# Patient Record
Sex: FEMALE | Race: WHITE | NOT HISPANIC OR LATINO | Employment: OTHER | URBAN - METROPOLITAN AREA
[De-identification: names, ages, dates, MRNs, and addresses within clinical notes are randomized per-mention and may not be internally consistent; named-entity substitution may affect disease eponyms.]

---

## 2017-01-03 ENCOUNTER — HOSPITAL ENCOUNTER (OUTPATIENT)
Facility: HOSPITAL | Age: 82
Setting detail: OBSERVATION
Discharge: NURSING, REHAB OR LONG-TERM CARE | End: 2017-01-04
Attending: EMERGENCY MEDICINE | Admitting: HOSPITALIST
Payer: MEDICARE

## 2017-01-03 DIAGNOSIS — N39.0 URINARY TRACT INFECTION: Primary | ICD-10-CM

## 2017-01-03 PROBLEM — I73.00 RAYNAUD PHENOMENON: Status: ACTIVE | Noted: 2017-01-03

## 2017-01-03 PROBLEM — M48.00 SPINAL STENOSIS: Status: ACTIVE | Noted: 2017-01-03

## 2017-01-03 PROBLEM — M81.0 OSTEOPOROSIS: Status: ACTIVE | Noted: 2017-01-03

## 2017-01-03 PROBLEM — K21.9 GERD (GASTROESOPHAGEAL REFLUX DISEASE): Status: ACTIVE | Noted: 2017-01-03

## 2017-01-03 PROBLEM — F03.90 DEMENTIA (HCC): Status: ACTIVE | Noted: 2017-01-03

## 2017-01-03 PROBLEM — Z86.718 HISTORY OF DVT (DEEP VEIN THROMBOSIS): Status: ACTIVE | Noted: 2017-01-03

## 2017-01-03 LAB
ANION GAP SERPL CALCULATED.3IONS-SCNC: 7 MMOL/L (ref 4–13)
BASOPHILS # BLD AUTO: 0.03 THOUSANDS/ΜL (ref 0–0.1)
BASOPHILS NFR BLD AUTO: 0 % (ref 0–1)
BUN SERPL-MCNC: 14 MG/DL (ref 5–25)
CALCIUM SERPL-MCNC: 9.7 MG/DL (ref 8.3–10.1)
CHLORIDE SERPL-SCNC: 105 MMOL/L (ref 100–108)
CO2 SERPL-SCNC: 28 MMOL/L (ref 21–32)
CREAT SERPL-MCNC: 0.6 MG/DL (ref 0.6–1.3)
EOSINOPHIL # BLD AUTO: 0.15 THOUSAND/ΜL (ref 0–0.61)
EOSINOPHIL NFR BLD AUTO: 2 % (ref 0–6)
ERYTHROCYTE [DISTWIDTH] IN BLOOD BY AUTOMATED COUNT: 15.1 % (ref 11.6–15.1)
GFR SERPL CREATININE-BSD FRML MDRD: >60 ML/MIN/1.73SQ M
GLUCOSE SERPL-MCNC: 92 MG/DL (ref 65–140)
HCT VFR BLD AUTO: 39.3 % (ref 34.8–46.1)
HGB BLD-MCNC: 12.9 G/DL (ref 11.5–15.4)
LYMPHOCYTES # BLD AUTO: 2.48 THOUSANDS/ΜL (ref 0.6–4.47)
LYMPHOCYTES NFR BLD AUTO: 36 % (ref 14–44)
MCH RBC QN AUTO: 29.5 PG (ref 26.8–34.3)
MCHC RBC AUTO-ENTMCNC: 32.8 G/DL (ref 31.4–37.4)
MCV RBC AUTO: 90 FL (ref 82–98)
MONOCYTES # BLD AUTO: 0.48 THOUSAND/ΜL (ref 0.17–1.22)
MONOCYTES NFR BLD AUTO: 7 % (ref 4–12)
NEUTROPHILS # BLD AUTO: 3.75 THOUSANDS/ΜL (ref 1.85–7.62)
NEUTS SEG NFR BLD AUTO: 55 % (ref 43–75)
NRBC BLD AUTO-RTO: 0 /100 WBCS
PLATELET # BLD AUTO: 167 THOUSANDS/UL (ref 149–390)
PMV BLD AUTO: 11.2 FL (ref 8.9–12.7)
POTASSIUM SERPL-SCNC: 4 MMOL/L (ref 3.5–5.3)
RBC # BLD AUTO: 4.37 MILLION/UL (ref 3.81–5.12)
SODIUM SERPL-SCNC: 140 MMOL/L (ref 136–145)
WBC # BLD AUTO: 6.9 THOUSAND/UL (ref 4.31–10.16)

## 2017-01-03 PROCEDURE — 80048 BASIC METABOLIC PNL TOTAL CA: CPT | Performed by: EMERGENCY MEDICINE

## 2017-01-03 PROCEDURE — 96374 THER/PROPH/DIAG INJ IV PUSH: CPT

## 2017-01-03 PROCEDURE — 36415 COLL VENOUS BLD VENIPUNCTURE: CPT | Performed by: EMERGENCY MEDICINE

## 2017-01-03 PROCEDURE — 85025 COMPLETE CBC W/AUTO DIFF WBC: CPT | Performed by: EMERGENCY MEDICINE

## 2017-01-03 RX ORDER — MULTIVITAMIN
1 CAPSULE ORAL DAILY
COMMUNITY

## 2017-01-03 RX ORDER — MELATONIN
1000 DAILY
Status: DISCONTINUED | OUTPATIENT
Start: 2017-01-04 | End: 2017-01-04 | Stop reason: HOSPADM

## 2017-01-03 RX ORDER — DOCUSATE SODIUM 100 MG/1
100 CAPSULE, LIQUID FILLED ORAL DAILY
Status: DISCONTINUED | OUTPATIENT
Start: 2017-01-04 | End: 2017-01-04 | Stop reason: HOSPADM

## 2017-01-03 RX ORDER — ONDANSETRON 2 MG/ML
4 INJECTION INTRAMUSCULAR; INTRAVENOUS EVERY 6 HOURS PRN
Status: DISCONTINUED | OUTPATIENT
Start: 2017-01-03 | End: 2017-01-04 | Stop reason: HOSPADM

## 2017-01-03 RX ORDER — DEXTRIN 3 G/3.5 G
POWDER (GRAM) ORAL
COMMUNITY

## 2017-01-03 RX ORDER — ACETAMINOPHEN 325 MG/1
650 TABLET ORAL EVERY 6 HOURS PRN
Status: DISCONTINUED | OUTPATIENT
Start: 2017-01-03 | End: 2017-01-04 | Stop reason: HOSPADM

## 2017-01-03 RX ORDER — MIRTAZAPINE 15 MG/1
15 TABLET, FILM COATED ORAL
Status: DISCONTINUED | OUTPATIENT
Start: 2017-01-04 | End: 2017-01-04 | Stop reason: HOSPADM

## 2017-01-03 RX ORDER — DONEPEZIL HYDROCHLORIDE 5 MG/1
5 TABLET, FILM COATED ORAL
COMMUNITY

## 2017-01-03 RX ORDER — LIDOCAINE 50 MG/G
1 PATCH TOPICAL EVERY 24 HOURS
COMMUNITY

## 2017-01-03 RX ORDER — AMLODIPINE BESYLATE 2.5 MG/1
2.5 TABLET ORAL DAILY
Status: DISCONTINUED | OUTPATIENT
Start: 2017-01-04 | End: 2017-01-04 | Stop reason: HOSPADM

## 2017-01-03 RX ORDER — AMLODIPINE BESYLATE 2.5 MG/1
2.5 TABLET ORAL DAILY
COMMUNITY

## 2017-01-03 RX ORDER — DONEPEZIL HYDROCHLORIDE 5 MG/1
5 TABLET, FILM COATED ORAL
Status: DISCONTINUED | OUTPATIENT
Start: 2017-01-04 | End: 2017-01-04 | Stop reason: HOSPADM

## 2017-01-03 RX ORDER — LIDOCAINE 50 MG/G
1 PATCH TOPICAL EVERY 24 HOURS
Status: DISCONTINUED | OUTPATIENT
Start: 2017-01-03 | End: 2017-01-04 | Stop reason: HOSPADM

## 2017-01-03 RX ORDER — DOCUSATE SODIUM 100 MG/1
100 CAPSULE, LIQUID FILLED ORAL DAILY
COMMUNITY

## 2017-01-03 RX ORDER — LANOLIN ALCOHOL/MO/W.PET/CERES
2 CREAM (GRAM) TOPICAL 2 TIMES DAILY
COMMUNITY

## 2017-01-03 RX ORDER — MIRTAZAPINE 15 MG/1
15 TABLET, FILM COATED ORAL
COMMUNITY

## 2017-01-03 RX ORDER — OMEPRAZOLE 20 MG/1
20 CAPSULE, DELAYED RELEASE ORAL 2 TIMES DAILY
COMMUNITY

## 2017-01-03 RX ORDER — SIMETHICONE 80 MG
80 TABLET,CHEWABLE ORAL
Status: DISCONTINUED | OUTPATIENT
Start: 2017-01-03 | End: 2017-01-04 | Stop reason: HOSPADM

## 2017-01-03 RX ORDER — B-COMPLEX WITH VITAMIN C
1 TABLET ORAL
Status: DISCONTINUED | OUTPATIENT
Start: 2017-01-04 | End: 2017-01-04 | Stop reason: HOSPADM

## 2017-01-03 RX ORDER — PANTOPRAZOLE SODIUM 40 MG/1
40 TABLET, DELAYED RELEASE ORAL
Status: DISCONTINUED | OUTPATIENT
Start: 2017-01-04 | End: 2017-01-04 | Stop reason: HOSPADM

## 2017-01-03 RX ORDER — ACETAMINOPHEN 325 MG/1
650 TABLET ORAL EVERY 6 HOURS PRN
COMMUNITY

## 2017-01-03 RX ADMIN — Medication 1000 MG: at 17:53

## 2017-01-04 VITALS
SYSTOLIC BLOOD PRESSURE: 149 MMHG | OXYGEN SATURATION: 97 % | DIASTOLIC BLOOD PRESSURE: 89 MMHG | TEMPERATURE: 97.4 F | WEIGHT: 115 LBS | HEART RATE: 75 BPM | RESPIRATION RATE: 18 BRPM

## 2017-01-04 PROBLEM — R82.79 URINE CULTURE POSITIVE: Status: ACTIVE | Noted: 2017-01-04

## 2017-01-04 PROBLEM — G89.29 CHRONIC ABDOMINAL PAIN: Chronic | Status: ACTIVE | Noted: 2017-01-04

## 2017-01-04 PROBLEM — R10.9 CHRONIC ABDOMINAL PAIN: Chronic | Status: ACTIVE | Noted: 2017-01-04

## 2017-01-04 PROCEDURE — 99284 EMERGENCY DEPT VISIT MOD MDM: CPT

## 2017-01-04 RX ADMIN — DOCUSATE SODIUM 100 MG: 100 CAPSULE, LIQUID FILLED ORAL at 09:04

## 2017-01-04 RX ADMIN — DONEPEZIL HYDROCHLORIDE 5 MG: 5 TABLET, FILM COATED ORAL at 00:35

## 2017-01-04 RX ADMIN — RIVAROXABAN 20 MG: 20 TABLET, FILM COATED ORAL at 08:01

## 2017-01-04 RX ADMIN — CALCIUM CARBONATE 500 MG (1,250 MG)-VITAMIN D3 200 UNIT TABLET 1 TABLET: at 08:00

## 2017-01-04 RX ADMIN — AMLODIPINE BESYLATE 2.5 MG: 2.5 TABLET ORAL at 09:00

## 2017-01-04 RX ADMIN — SIMETHICONE CHEW TAB 80 MG 80 MG: 80 TABLET ORAL at 00:35

## 2017-01-04 RX ADMIN — SIMETHICONE CHEW TAB 80 MG 80 MG: 80 TABLET ORAL at 08:02

## 2017-01-04 RX ADMIN — VITAMIN D, TAB 1000IU (100/BT) 1000 UNITS: 25 TAB at 09:02

## 2017-01-04 RX ADMIN — MIRTAZAPINE 15 MG: 15 TABLET, FILM COATED ORAL at 00:36

## 2017-01-04 RX ADMIN — PANTOPRAZOLE SODIUM 40 MG: 40 TABLET, DELAYED RELEASE ORAL at 05:38

## 2017-05-21 ENCOUNTER — APPOINTMENT (EMERGENCY)
Dept: RADIOLOGY | Facility: HOSPITAL | Age: 82
End: 2017-05-21
Payer: MEDICARE

## 2017-05-21 ENCOUNTER — HOSPITAL ENCOUNTER (EMERGENCY)
Facility: HOSPITAL | Age: 82
Discharge: NURSING, REHAB OR LONG-TERM CARE | End: 2017-05-21
Attending: EMERGENCY MEDICINE | Admitting: EMERGENCY MEDICINE
Payer: MEDICARE

## 2017-05-21 VITALS
OXYGEN SATURATION: 98 % | SYSTOLIC BLOOD PRESSURE: 159 MMHG | TEMPERATURE: 98.1 F | RESPIRATION RATE: 16 BRPM | HEART RATE: 85 BPM | DIASTOLIC BLOOD PRESSURE: 83 MMHG

## 2017-05-21 DIAGNOSIS — B02.9 HERPES ZOSTER WITHOUT COMPLICATION: ICD-10-CM

## 2017-05-21 DIAGNOSIS — R10.9 NONSPECIFIC ABDOMINAL PAIN: Primary | ICD-10-CM

## 2017-05-21 LAB
ALBUMIN SERPL BCP-MCNC: 3.5 G/DL (ref 3.5–5)
ALP SERPL-CCNC: 55 U/L (ref 46–116)
ALT SERPL W P-5'-P-CCNC: 18 U/L (ref 12–78)
ANION GAP SERPL CALCULATED.3IONS-SCNC: 5 MMOL/L (ref 4–13)
AST SERPL W P-5'-P-CCNC: 16 U/L (ref 5–45)
BACTERIA UR QL AUTO: ABNORMAL /HPF
BASOPHILS # BLD AUTO: 0.05 THOUSANDS/ΜL (ref 0–0.1)
BASOPHILS NFR BLD AUTO: 1 % (ref 0–1)
BILIRUB SERPL-MCNC: 0.35 MG/DL (ref 0.2–1)
BILIRUB UR QL STRIP: NEGATIVE
BUN SERPL-MCNC: 15 MG/DL (ref 5–25)
CALCIUM SERPL-MCNC: 9.1 MG/DL (ref 8.3–10.1)
CHLORIDE SERPL-SCNC: 108 MMOL/L (ref 100–108)
CLARITY UR: CLEAR
CO2 SERPL-SCNC: 30 MMOL/L (ref 21–32)
COLOR UR: YELLOW
COLOR, POC: NORMAL
CREAT SERPL-MCNC: 0.52 MG/DL (ref 0.6–1.3)
EOSINOPHIL # BLD AUTO: 0.06 THOUSAND/ΜL (ref 0–0.61)
EOSINOPHIL NFR BLD AUTO: 1 % (ref 0–6)
ERYTHROCYTE [DISTWIDTH] IN BLOOD BY AUTOMATED COUNT: 15.6 % (ref 11.6–15.1)
GFR SERPL CREATININE-BSD FRML MDRD: >60 ML/MIN/1.73SQ M
GLUCOSE SERPL-MCNC: 97 MG/DL (ref 65–140)
GLUCOSE UR STRIP-MCNC: NEGATIVE MG/DL
HCT VFR BLD AUTO: 39.9 % (ref 34.8–46.1)
HGB BLD-MCNC: 12.8 G/DL (ref 11.5–15.4)
HGB UR QL STRIP.AUTO: ABNORMAL
HYALINE CASTS #/AREA URNS LPF: ABNORMAL /LPF
KETONES UR STRIP-MCNC: NEGATIVE MG/DL
LEUKOCYTE ESTERASE UR QL STRIP: ABNORMAL
LYMPHOCYTES # BLD AUTO: 1.82 THOUSANDS/ΜL (ref 0.6–4.47)
LYMPHOCYTES NFR BLD AUTO: 21 % (ref 14–44)
MCH RBC QN AUTO: 28.9 PG (ref 26.8–34.3)
MCHC RBC AUTO-ENTMCNC: 32.1 G/DL (ref 31.4–37.4)
MCV RBC AUTO: 90 FL (ref 82–98)
MONOCYTES # BLD AUTO: 0.56 THOUSAND/ΜL (ref 0.17–1.22)
MONOCYTES NFR BLD AUTO: 6 % (ref 4–12)
NEUTROPHILS # BLD AUTO: 6.23 THOUSANDS/ΜL (ref 1.85–7.62)
NEUTS SEG NFR BLD AUTO: 71 % (ref 43–75)
NITRITE UR QL STRIP: NEGATIVE
NON-SQ EPI CELLS URNS QL MICRO: ABNORMAL /HPF
NRBC BLD AUTO-RTO: 0 /100 WBCS
PH UR STRIP.AUTO: 7.5 [PH] (ref 4.5–8)
PLATELET # BLD AUTO: 151 THOUSANDS/UL (ref 149–390)
PMV BLD AUTO: 10.6 FL (ref 8.9–12.7)
POTASSIUM SERPL-SCNC: 4 MMOL/L (ref 3.5–5.3)
PROT SERPL-MCNC: 7 G/DL (ref 6.4–8.2)
PROT UR STRIP-MCNC: NEGATIVE MG/DL
RBC # BLD AUTO: 4.43 MILLION/UL (ref 3.81–5.12)
RBC #/AREA URNS AUTO: ABNORMAL /HPF
SODIUM SERPL-SCNC: 143 MMOL/L (ref 136–145)
SP GR UR STRIP.AUTO: 1.01 (ref 1–1.03)
UROBILINOGEN UR QL STRIP.AUTO: 0.2 E.U./DL
WBC # BLD AUTO: 8.74 THOUSAND/UL (ref 4.31–10.16)
WBC #/AREA URNS AUTO: ABNORMAL /HPF

## 2017-05-21 PROCEDURE — 81001 URINALYSIS AUTO W/SCOPE: CPT

## 2017-05-21 PROCEDURE — 81002 URINALYSIS NONAUTO W/O SCOPE: CPT | Performed by: EMERGENCY MEDICINE

## 2017-05-21 PROCEDURE — 87086 URINE CULTURE/COLONY COUNT: CPT

## 2017-05-21 PROCEDURE — 99284 EMERGENCY DEPT VISIT MOD MDM: CPT

## 2017-05-21 PROCEDURE — 85025 COMPLETE CBC W/AUTO DIFF WBC: CPT | Performed by: EMERGENCY MEDICINE

## 2017-05-21 PROCEDURE — 36415 COLL VENOUS BLD VENIPUNCTURE: CPT | Performed by: EMERGENCY MEDICINE

## 2017-05-21 PROCEDURE — 71020 HB CHEST X-RAY 2VW FRONTAL&LATL: CPT

## 2017-05-21 PROCEDURE — 74177 CT ABD & PELVIS W/CONTRAST: CPT

## 2017-05-21 PROCEDURE — 80053 COMPREHEN METABOLIC PANEL: CPT | Performed by: EMERGENCY MEDICINE

## 2017-05-21 RX ORDER — ALBUTEROL SULFATE 90 UG/1
2 AEROSOL, METERED RESPIRATORY (INHALATION) EVERY 6 HOURS PRN
COMMUNITY

## 2017-05-21 RX ORDER — NITROFURANTOIN 25; 75 MG/1; MG/1
100 CAPSULE ORAL 2 TIMES DAILY
Qty: 14 CAPSULE | Refills: 0 | Status: SHIPPED | OUTPATIENT
Start: 2017-05-21 | End: 2017-05-28

## 2017-05-21 RX ORDER — ACYCLOVIR 200 MG/1
800 CAPSULE ORAL ONCE
Status: COMPLETED | OUTPATIENT
Start: 2017-05-21 | End: 2017-05-21

## 2017-05-21 RX ORDER — ACYCLOVIR 400 MG/1
800 TABLET ORAL 4 TIMES DAILY
Qty: 40 TABLET | Refills: 0 | Status: SHIPPED | OUTPATIENT
Start: 2017-05-21 | End: 2017-05-26

## 2017-05-21 RX ADMIN — ACYCLOVIR 800 MG: 200 CAPSULE ORAL at 16:53

## 2017-05-21 RX ADMIN — IOHEXOL 100 ML: 350 INJECTION, SOLUTION INTRAVENOUS at 13:20

## 2017-05-23 LAB
BACTERIA UR CULT: NORMAL
BACTERIA UR CULT: NORMAL

## 2018-10-04 ENCOUNTER — IMMUNIZATION (OUTPATIENT)
Dept: GERIATRICS | Age: 83
End: 2018-10-04
Payer: MEDICARE

## 2018-10-04 DIAGNOSIS — Z23 ENCOUNTER FOR IMMUNIZATION: ICD-10-CM

## 2018-10-04 PROCEDURE — 90662 IIV NO PRSV INCREASED AG IM: CPT | Performed by: NURSE PRACTITIONER

## 2018-10-04 PROCEDURE — G0008 ADMIN INFLUENZA VIRUS VAC: HCPCS | Performed by: NURSE PRACTITIONER

## 2019-07-19 ENCOUNTER — TELEPHONE (OUTPATIENT)
Dept: OTHER | Facility: OTHER | Age: 84
End: 2019-07-19

## 2019-07-22 ENCOUNTER — NURSING HOME VISIT (OUTPATIENT)
Dept: GERIATRICS | Facility: OTHER | Age: 84
End: 2019-07-22
Payer: MEDICARE

## 2019-07-22 DIAGNOSIS — Z71.89 GOALS OF CARE, COUNSELING/DISCUSSION: ICD-10-CM

## 2019-07-22 DIAGNOSIS — S02.85XD CLOSED FRACTURE OF ORBIT WITH ROUTINE HEALING, SUBSEQUENT ENCOUNTER: Primary | ICD-10-CM

## 2019-07-22 DIAGNOSIS — R26.2 AMBULATORY DYSFUNCTION: ICD-10-CM

## 2019-07-22 DIAGNOSIS — R54 FRAILTY: ICD-10-CM

## 2019-07-22 DIAGNOSIS — M65.9 SYNOVITIS OF WRIST: ICD-10-CM

## 2019-07-22 DIAGNOSIS — Z79.899 ENCOUNTER FOR MEDICATION REVIEW: ICD-10-CM

## 2019-07-22 DIAGNOSIS — K21.9 GASTROESOPHAGEAL REFLUX DISEASE, ESOPHAGITIS PRESENCE NOT SPECIFIED: ICD-10-CM

## 2019-07-22 DIAGNOSIS — M81.0 AGE RELATED OSTEOPOROSIS, UNSPECIFIED PATHOLOGICAL FRACTURE PRESENCE: ICD-10-CM

## 2019-07-22 DIAGNOSIS — F03.90 DEMENTIA WITHOUT BEHAVIORAL DISTURBANCE, UNSPECIFIED DEMENTIA TYPE (HCC): ICD-10-CM

## 2019-07-22 DIAGNOSIS — N39.0 FREQUENT UTI: ICD-10-CM

## 2019-07-22 DIAGNOSIS — F01.50 VASCULAR DEMENTIA WITHOUT BEHAVIORAL DISTURBANCE (HCC): ICD-10-CM

## 2019-07-22 DIAGNOSIS — R32 URINARY INCONTINENCE, UNSPECIFIED TYPE: ICD-10-CM

## 2019-07-22 DIAGNOSIS — G89.11 ACUTE PAIN DUE TO TRAUMA: ICD-10-CM

## 2019-07-22 DIAGNOSIS — I73.00 RAYNAUD'S PHENOMENON WITHOUT GANGRENE: ICD-10-CM

## 2019-07-22 DIAGNOSIS — H91.93 BILATERAL HEARING LOSS, UNSPECIFIED HEARING LOSS TYPE: ICD-10-CM

## 2019-07-22 PROCEDURE — 99306 1ST NF CARE HIGH MDM 50: CPT | Performed by: FAMILY MEDICINE

## 2019-07-22 NOTE — PROGRESS NOTES
Ankit Sanabria   [address] [facility], [address] [facility]  History and Physical    Records Reviewed include:  Hospital records  Unable to obtain from patient due to: unable to obtain from patient: Dementia    Chief Complaint/ Reason for Admission: Subacute Rehabilitation     History of Present Illness:              Aline Chávez, 80 y old female patient got admitted to [facility] for subacute rehabilitation  Patient was intially admitted to the hospital with a history of fall and injury to her left side of the face and neck  Patient has known medical history of Demtia, Osteoporosis, GERD, Ankylosing spondylitis, DVT,  Lt femur fracture and surgery for it  According to her son patient had history of fall during a bath at [facility] living and got hurt on her face  Patient lives in [facility] living for past five years  During the hospital stay she had CT head which showed no intracranial hemorrhage but facial bone fracture on the left and orbital fracture  Ct- cervical spine negative for any fracture  Ct-Abdomen showed no intraabdominal injury, but showed 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 4 1cm Patient also had X ray shoulder and hip at the hospital, which did not show any fracture  Plastic surgeon was consulted, no surgeries done, and will follow up in a week  During her hospital stay patient had swelling on her right and left wrist and was started on Prednison was tapered and stopped  Her C-reactive protein and ESR were elevated  She is getting Rheumatologist opinion in the month of August/2019  Patient is completely dependent for her ADLS, and has urinary incontinence and she was place on Cipro for her recurrent UTI  Patient required physical and occupational therapy for her muscle weakness and ambulatory dysfunction Patient is transferred to [facility] for further treatment  Patient is admitted at [facility] for subacute rehabilitation   She is non- verbal, most history obtained from her son and the hospital records  Patient was seen in her room sitting on a wheelchair, with her son  Allergies    Allergies   Allergen Reactions    Lactose        Past Medical History  Past Medical History:   Diagnosis Date    Dizziness     Gait abnormality     Hypertension     Osteoporosis     Spinal stenosis      Family History; Non contributory                               Social History  Social History     Tobacco Use   Smoking Status Former Smoker   Smokeless Tobacco Former User      Social History     Substance and Sexual Activity   Alcohol Use No      Social History     Substance and Sexual Activity   Drug Use No        Lives: Assisted Living,  Social Support:   Education Level: High School  Occupation: Clerical work  Fall in the past 12 months: once   Use of assistance Device: Wheelchair    Physical Exam    Weight: 108 8lbs Temp: 9 1 BP: 94/60 Pulse:86/m  Resp:18/m  O2 Sat:91% RA  Constitutional: Cachetic  Orientation:Person     Physical Exam   Constitutional: No distress  Pt non verbal   HENT:   Head: Normocephalic  Mouth/Throat: Oropharynx is clear and moist    Pt has bruises on her left side of the face and neck     Eyes: Pupils are equal, round, and reactive to light  Conjunctivae and EOM are normal  Right eye exhibits no discharge  Left eye exhibits no discharge  Neck: Normal range of motion  Neck supple  No JVD present  No tracheal deviation present  Cardiovascular: Normal rate, regular rhythm and normal heart sounds  Exam reveals no friction rub  No murmur heard  Pulmonary/Chest: Effort normal and breath sounds normal  She has no wheezes  She exhibits no tenderness  Abdominal: Soft  Bowel sounds are normal  She exhibits no distension  There is no tenderness  Musculoskeletal: She exhibits tenderness  She exhibits no edema  Reduced muscle mass LL>UL  ROM restricted LL>UL     Neurological: She is alert  No cranial nerve deficit     Pt oriented t self and her son    Skin: Skin is warm  No rash noted  Post fall bruises on left side of face neck   stages of hematoma presents   Psychiatric:   Mood, behavior difficult to assess due to her Dementia   Nursing note and vitals reviewed  Review of Systems:  Review of Systems   Constitutional:        Patient has minimum comunication due to her dementia status  ROS was not able to obtain       List of Current Medications: Allergies    Allergies   Allergen Reactions    Lactose        Labs/Diagnostics (reviewed by this provider): Hospital Paperwork  07/16  Hemoglobin  11 3Low     Hematocrit  33 8Low     WBC  11 4High       Ref Range & Units 7/15/19 1336    C-Reactive Protein  144  0high     Sed Rate 0 - 30 mm/hr 51High         Imaging Reviewed: 1  X- ray Left Shoulder and X-ray Octavio Hp : No eveidence of acute fracture  2  CT- Head c/ocontrast   No Intracranial Fracture   Old white matter infarction    Facial bone fracture on the left with left orbital fractures, tripod fracture  3  Ct Cerviacl spine  No cervical fracture  4  Ct Thoracic spine   No acute fracture  5  Ct chest and Abdomen    No acute injury    Colonic diverticulosis    cross fused renal ectopia    Mild fusiform aneurysm dilation of the ascending thoracic aorta 4 3am     2 Infrarenal abdominal aortic aneurysm 4 1cm        Assessment/Plan:    1  Muscle weakness, Ambulatory dysfuction; PT/OT      Fall precaution      Nutritional care    2  Left Orbital Fracturesacute       Acute Pain      Plastic surgeon consultation done and follow up in a week     Brusis resolving     Cold compression for her face and neck three time a day     Cont Tylenol 500mg q6h       3  Dementia- no new changes      Supportive care      Encourage social, physical activities      Hold on Aricept     4  GERD under control    Omeprazol 20 mg     4  Left and right Wrist swelling - resolved with Prednison     Elevated- CRP and ESR     Rhematologist consult in August/2019    5   HTN    Blood pressure on lower side 94/60    Monitor bld pressure    Hold on Norvasc    6  Urinary Incontinence/ Recurrent UTI      D/C Cipro       Cranberry Juice ( nectar thick)     7  GERD- Stable       D/C omeprazol         Pain: Present  Origin: Fall Location: Left face and Left leg  Rehab Potential:Limited  Patient Informed of Medical Condition: Yes:  Patient is Capable of Understanding Their Right: Minimal due to Dementia  Prognosis:Fair  Discharge Plan:  To [facility] living after Rehabilitation  Surrogate Decision Maker: SON  Flu Vaccine: Vaccine: Received on 10/4/2108  Advanced Directive; yes  Code status:DNR (Per discussion with resident or Nafisa Albert)      Waqar Huggins MD   Geriatric Fellow  7/16/060233:22 PM

## 2019-07-22 NOTE — PROGRESS NOTES
Chest Pain: {MI Rule out assessment:76662::"with no ECG evidence of acute ischemia and negative cardiac enzymes "}    Plan:  {plan:95973}

## 2019-07-25 ENCOUNTER — NURSING HOME VISIT (OUTPATIENT)
Dept: GERIATRICS | Facility: OTHER | Age: 84
End: 2019-07-25
Payer: MEDICARE

## 2019-07-25 DIAGNOSIS — F03.90 DEMENTIA WITHOUT BEHAVIORAL DISTURBANCE, UNSPECIFIED DEMENTIA TYPE (HCC): ICD-10-CM

## 2019-07-25 DIAGNOSIS — S02.32XA CLOSED FRACTURE OF LEFT ORBITAL FLOOR (HCC): Primary | ICD-10-CM

## 2019-07-25 DIAGNOSIS — E46 PROTEIN CALORIE MALNUTRITION (HCC): ICD-10-CM

## 2019-07-25 DIAGNOSIS — R53.81 PHYSICAL DECONDITIONING: ICD-10-CM

## 2019-07-25 DIAGNOSIS — R29.898 MUSCULAR DECONDITIONING: ICD-10-CM

## 2019-07-25 PROCEDURE — 99309 SBSQ NF CARE MODERATE MDM 30: CPT | Performed by: NURSE PRACTITIONER

## 2019-07-25 NOTE — PROGRESS NOTES
[address], [address]  STR Note    Chief Complaint/Reason for visit: STR follow up- Left Orbital Fracture; Muscle deconditioning, physical deconditioning, and ambulatory dysfunction; Dementia; HTN; Low BMI  History of Present Illness: 80year-old female seen and examined for follow up  Daughter Marine Da Silva was at bedside for exam and addressed concerns regarding patient's weakness and deconditioning  Patient looked at me when I spoke to her  Answered with yes or no to few questions asked then closed her eyes for the rest of exam  She is not reliable for history  She did not show any s/s of pain  Past Medical History: unchanged from history and physical  Past Medical History:   Diagnosis Date    Dizziness     Gait abnormality     Hypertension     Osteoporosis     Spinal stenosis      Family History: unchanged from history and physical  Social History: unchanged from history and physical  Resident Since: 07/19/2019  Review of systems: Review of Systems   Unable to perform ROS: Dementia     Medications: No changes made  Allergies: Reviewed and unchanged  Consults reviewed:PT, OT and Other  Labs/Diagnostics (reviewed by this provider): Copy in Chart    Imaging Reviewed: No new    Physical Exam    Weight:  Temp: 96 6 BP: 97/59 Pulse: 82 Resp: 18 O2 Sat: 95% RA  Constitutional: Normocephalic  Orientation:Alert , confused, and disoriented    Physical Exam   Constitutional: She appears well-developed  No distress  Appeared tired  HENT:   Resolving ecchymosis left side of face  Eyes: Pupils are equal, round, and reactive to light  EOM are normal  Right eye exhibits no discharge  Left eye exhibits no discharge  Neck: Normal range of motion  Neck supple  No JVD present  No tracheal deviation present  Cardiovascular: Normal rate, regular rhythm and normal heart sounds  Exam reveals no gallop and no friction rub  No murmur heard    Pulmonary/Chest: Effort normal and breath sounds normal  No respiratory distress  She has no wheezes  She has no rales  Abdominal: Soft  Bowel sounds are normal  She exhibits no distension  There is no tenderness  There is no guarding  Musculoskeletal: She exhibits no edema  BLE weakness  Lymphadenopathy:     She has no cervical adenopathy  Neurological: She is alert  Skin: Skin is warm and dry  She is not diaphoretic  Psychiatric: Her behavior is normal    Flat affect   Nursing note and vitals reviewed  Assessment/Plan:  77-year-old female with:     Left Orbital Fracture  -f/u with plastic surgeon  -resolving ecchymosis   -continue scheduled tylenol 500mg po QID    Muscle deconditioning, physical deconditioning, and ambulatory dysfunction  -[facility] and support at [facility] for ADLs, PT, OT  -fall precautions  -provide nutritional support  -daughter was concerned that patient is not participating well in PT   Spoke to OT to keep me apprised on progress    Dementia  -provide supportive care, redirection, and reorientation   -encourage social activities    HTN  -currently normotensive to hypotensive  -BPs 90s-120s  -not on antihypertensives    Protein-calorie malnutrition- Low BMI, thin, signs of muscle wasting, and frail   -monitor weekly weights  -dietitian following  -continue nutritional supplements      Bairon Vo 79, [address]  8/42/74258:04 PM

## 2019-07-29 ENCOUNTER — NURSING HOME VISIT (OUTPATIENT)
Dept: GERIATRICS | Facility: OTHER | Age: 84
End: 2019-07-29
Payer: MEDICARE

## 2019-07-29 DIAGNOSIS — R29.898 MUSCULAR DECONDITIONING: ICD-10-CM

## 2019-07-29 DIAGNOSIS — R26.2 AMBULATORY DYSFUNCTION: ICD-10-CM

## 2019-07-29 DIAGNOSIS — F03.90 DEMENTIA (HCC): ICD-10-CM

## 2019-07-29 DIAGNOSIS — R53.81 PHYSICAL DECONDITIONING: ICD-10-CM

## 2019-07-29 DIAGNOSIS — S02.32XD CLOSED FRACTURE OF LEFT ORBITAL FLOOR WITH ROUTINE HEALING: Primary | ICD-10-CM

## 2019-07-29 DIAGNOSIS — E46 PROTEIN CALORIE MALNUTRITION (HCC): ICD-10-CM

## 2019-07-29 PROCEDURE — 99309 SBSQ NF CARE MODERATE MDM 30: CPT | Performed by: NURSE PRACTITIONER

## 2019-07-29 NOTE — PROGRESS NOTES
[facility]   [address] [place] [address], [place], [address]  STR Note    Chief Complaint/Reason for visit: STR Follow up- Left Orbital Fracture; Muscle deconditioning, physical deconditioning, and ambulatory dysfunction; Dementia; Protein-calorie malnutrion  History of Present Illness: 80-year-old female seen and examined for follow up  She was sitting OOB in wheelchair in no distress  Pleasant and cooperative with exam  Appetite has been poor and will assess weight today  Therapy progress is limited by cognitive deficits, therefore decreased participation  Past Medical History: unchanged from history and physical  Past Medical History:   Diagnosis Date    Dizziness     Gait abnormality     Hypertension     Osteoporosis     Spinal stenosis      Family History: unchanged from history and physical  Social History: unchanged from history and physical  Resident Since: 07/19/2019  Review of systems: Review of Systems   Unable to perform ROS: Dementia     Medications: No changes made  Allergies: Reviewed and unchanged  Consults reviewed:PT, OT and Other  Labs/Diagnostics (reviewed by this provider): Copy in Chart    Imaging Reviewed: No new    Physical Exam    Weight:  Temp: 98 5 BP: 107/67 Pulse: 95 Resp: 14 O2 Sat: 96% RA  Constitutional: Normocephalic  Orientation:confused and disoriented      Physical Exam   Constitutional: She appears well-developed  No distress  HENT:   Eyes: Pupils are equal, round, and reactive to light  EOM are normal  Right eye exhibits no discharge  Left eye exhibits no discharge  Neck: Normal range of motion  Neck supple  No JVD present  No tracheal deviation present  Cardiovascular: Normal rate, regular rhythm and normal heart sounds  Exam reveals no gallop and no friction rub  No murmur heard  Pulmonary/Chest: Effort normal and breath sounds normal  No respiratory distress  She has no wheezes  She has no rales  Abdominal: Soft  Bowel sounds are normal  She exhibits no distension  There is no tenderness  There is no guarding  Musculoskeletal: She exhibits no edema  BLE weakness  Lymphadenopathy:     She has no cervical adenopathy  Neurological: She is alert and looks at me when I am speaking to her  Answers with yes or no to questions asked  Skin: Skin is warm and dry  She is not diaphoretic  Psychiatric: Calm and pleasant  Nursing note and vitals reviewed  Assessment/Plan:  80-year-old female with:      Left Orbital Fracture  -no s/s pain   -f/u with plastic surgeon  -resolving ecchymosis   -continue scheduled tylenol 500mg po QID     Muscle deconditioning, physical deconditioning, and ambulatory dysfunction  -continue care and support at [facility] for ADLs, PT, OT  -fall precautions  -provide nutritional support  -daughter was concerned that patient is not participating well in PT  Spoke to OT to keep me apprised on progress  -PT Progress notes: Pt's performance/ functional gains greatly depends on participation, and cog deficits  With daugter present and max encouragement of 2 people, pt has been able to stand with mod A of 2 for 15-30s at a time  Other times pt does not participate in movement and requires max A of 2  Pt with good family support, and at times has participated in standing  She is limited by cog defcitis, and decreased participation  Dementia  -provide supportive care, redirection, and reorientation   -encourage social activities     HTN  -currently normotensive to hypotensive  -BPs remain stable  -not on antihypertensives     Protein-calorie malnutrition- Low BMI, thin, signs of muscle wasting, and frail   -last recorded weight from 07/20   Requested nursing weigh patient today  -monitor weekly weights  -dietitian following  -continue nutritional supplements    Baiorn Vo 79, [address]  7/29/201912:39 PM

## 2019-07-31 ENCOUNTER — NURSING HOME VISIT (OUTPATIENT)
Dept: GERIATRICS | Facility: OTHER | Age: 84
End: 2019-07-31
Payer: MEDICARE

## 2019-07-31 DIAGNOSIS — S02.32XD CLOSED FRACTURE OF LEFT ORBITAL FLOOR WITH ROUTINE HEALING: Primary | ICD-10-CM

## 2019-07-31 DIAGNOSIS — R29.898 MUSCULAR DECONDITIONING: ICD-10-CM

## 2019-07-31 DIAGNOSIS — F03.90 DEMENTIA (HCC): ICD-10-CM

## 2019-07-31 DIAGNOSIS — R26.2 AMBULATORY DYSFUNCTION: ICD-10-CM

## 2019-07-31 DIAGNOSIS — R53.81 PHYSICAL DECONDITIONING: ICD-10-CM

## 2019-07-31 PROCEDURE — 99309 SBSQ NF CARE MODERATE MDM 30: CPT | Performed by: NURSE PRACTITIONER

## 2019-07-31 NOTE — PROGRESS NOTES
[address], [address]  STR Note    Chief Complaint/Reason for visit: STR Follow up- Left Orbital Fracture; Muscle deconditioning, physical deconditioning, and ambulatory dysfunction; Dementia; HTN  History of Present Illness:   43-year-old female seen and examined for follow up  She was in bed in no distress  She answered "no" when I assessed for pain  No s/s pain at rest  Discharge planning in place for tentative discharge from SNF on 08/07  Past Medical History: unchanged from history and physical  Past Medical History:   Diagnosis Date    Dizziness     Gait abnormality     Hypertension     Osteoporosis     Spinal stenosis      Family History: unchanged from history and physical  Social History: unchanged from history and physical  Resident Since: 07/19/2019  Review of systems: Review of Systems  Unable to perform ROS: Dementia   Medications: No changes made  Allergies: Reviewed and unchanged  Consults reviewed:PT, OT, Speech and Other  Labs/Diagnostics (reviewed by this provider): Copy in Chart    Imaging Reviewed: No new    Physical Exam    Weight: 107 6 pounds Temp: 97 7 BP: 112/64 Pulse: 78 Resp: 16 O2 Sat: 99% RA  Constitutional: Normocephalic  Orientation:Confused and disoriented     Physical Exam   Constitutional: She appears well-developed, thin, and frail No distress  HENT:   Eyes: Pupils are equal, round, and reactive to light  EOM are normal  Right eye exhibits no discharge  Left eye exhibits no discharge  Neck: Normal range of motion  Neck supple  No JVD present  No tracheal deviation present  Cardiovascular: Normal rate, regular rhythm and normal heart sounds  Exam reveals no gallop and no friction rub    No murmur heard  Pulmonary/Chest: Effort normal and breath sounds normal  No respiratory distress  She has no wheezes  She has no rales  Abdominal: Soft  Bowel sounds are normal  She exhibits no distension  There is no tenderness  There is no guarding  Musculoskeletal: She exhibits no edema    BLE weakness    Lymphadenopathy:     She has no cervical adenopathy  Neurological: She is alert and pleasantly confused  Answers with yes or no to questions asked  Skin: Skin is warm and dry  She is not diaphoretic  Resolving ecchymosis left side of face and left neck  Psychiatric: Calm  Nursing note and vitals reviewed  Assessment/Plan:  80-year-old female with:      Left Orbital Fracture  -no s/s pain   -f/u with plastic surgeon  -resolving ecchymosis   -continue scheduled tylenol 500mg po QID     Muscle deconditioning, physical deconditioning, and ambulatory dysfunction  -continue care and support at [facility] for ADLs, PT, OT, ST  -fall precautions  -provide nutritional support  -PT/OT limited due to cognitive impairment and decreased participation     Dementia  -provide supportive care, redirection, and reorientation   -encourage social activities     HTN  -BPs trending 90s-110s  -BPs remain stable  -not on antihypertensives     Protein-calorie malnutrition- Low BMI, thin, signs of muscle wasting, and frail   -weight is stable   Weight 107 6 pounds on 07/29 <----107 2 on admission on 07/19   -monitor weekly weights  -dietitian following  -continue nutritional supplements    Alter Wall 79, [address]  3/16/040694:18 PM

## 2019-08-06 ENCOUNTER — NURSING HOME VISIT (OUTPATIENT)
Dept: GERIATRICS | Facility: OTHER | Age: 84
End: 2019-08-06
Payer: MEDICARE

## 2019-08-06 DIAGNOSIS — R53.81 PHYSICAL DECONDITIONING: ICD-10-CM

## 2019-08-06 DIAGNOSIS — R29.898 MUSCULAR DECONDITIONING: ICD-10-CM

## 2019-08-06 DIAGNOSIS — R54 FRAIL ELDERLY: ICD-10-CM

## 2019-08-06 DIAGNOSIS — R26.2 AMBULATORY DYSFUNCTION: ICD-10-CM

## 2019-08-06 DIAGNOSIS — E46 PROTEIN CALORIE MALNUTRITION (HCC): ICD-10-CM

## 2019-08-06 DIAGNOSIS — F03.90 DEMENTIA WITHOUT BEHAVIORAL DISTURBANCE, UNSPECIFIED DEMENTIA TYPE (HCC): Primary | ICD-10-CM

## 2019-08-06 PROCEDURE — 99315 NF DSCHRG MGMT 30 MIN/LESS: CPT | Performed by: NURSE PRACTITIONER

## 2019-08-06 NOTE — PROGRESS NOTES
[facility]   [address] [place] [address], [address]   Discharge Summary  Addendum Note: I was informed at 4:19 pm that patient will be discharged to [facility] tomorrow 08/07/2019 at 12 noon  Chief Complaint/Reason for visit: STR Follow up- Patient will be transitioning to private pay after today  Left orbital fracture; Muscle deconditioning, physical deconditioning, and ambulatory dysfunction; Dementia; HTN; Protein-calorie malnutrition  History of Present Illness: 79-year-old female seen and examined for follow up  Donaldo Troncoso from [facility] informed me that patient's family has decided on having her stay here private pay until they find a LTCF  Patient is alert and cooperative with exam  No s/s pain  She also shook her head when asked if she had any pain  She immediately closed her eyes when not being disturbed  No acute issues reported by nursing  Admission Diagnoses: Left orbital fractures; Acute pain due to trauma; Vascular vs  Mixed type Dementia; Muscular deconditioning; Physical deconditioning; Frailty; Ambulatory dysfunction    Discharge Diagnoses: See problem list follow-up recommendations below    Past Medical History: unchanged from history and physical  Past Medical History:   Diagnosis Date    Dizziness     Gait abnormality     Hypertension     Osteoporosis     Spinal stenosis      Discharge Summary: Patient was admitted to [facility] on 07/19/2019 for rehab following hospitalization for s/p fall with left facial injuries including left orbital fractures  During her stay at , she received skilled nursing care, PT, OT, SS services, Dietitian support, and medical management  Patient's functional status did not improve due to lack of participation and cognitive status  She is scheduled to be discharged to [facility] tomorrow 08/07/2019 as per family's wishes         Family History: unchanged from history and physical  Social History: unchanged from history and physical  Resident Since: 07/19/2019  Review of systems: Review of Systems   Unable to perform ROS: Dementia     Medications: All medication orders were reviewed at facility EHR  No changes made  Allergies: Reviewed and unchanged  Consults reviewed:PT, OT and Speech  Labs/Diagnostics (reviewed by this provider): Copy in Chart    Imaging Reviewed: No new    Physical Exam    Weight:  Temp: 98 4 BP: 112/75 Pulse: 86 Resp: 16 O2 Sat: 93% RA  Constitutional: Normocephalic  Orientation: Alert and keeps eyes closed most of time when not disturbed    Follow-up recommendations:  1  Please follow-up with PCP within 7-10 days of discharge  2  F/U with Plastic surgeon     Physical Exam   Constitutional: She appears well-developed  HENT:   Mouth/Throat: No oropharyngeal exudate  Eyes: Conjunctivae are normal  Right eye exhibits no discharge  Left eye exhibits no discharge  Neck: Normal range of motion  Neck supple  No JVD present  No tracheal deviation present  Cardiovascular: Normal rate, regular rhythm and intact distal pulses  Exam reveals no gallop and no friction rub  No murmur heard  Pulmonary/Chest: Effort normal and breath sounds normal  No respiratory distress  She has no wheezes  She has no rales  Abdominal: Soft  Bowel sounds are normal  She exhibits no distension  There is no tenderness  There is no guarding  Musculoskeletal: She exhibits no edema  Lymphadenopathy:     She has no cervical adenopathy  Neurological: She is alert  Nods or shakes her head to few questions asked  Skin: Skin is warm and dry  Capillary refill takes less than 2 seconds  Psychiatric:   Flat affect, calm   Nursing note and vitals reviewed      Problem list follow-up recommendations:  51-year-old female with:      Left Orbital Fracture  -no s/s pain   -f/u with plastic surgeon  -resolving ecchymosis   -continue scheduled tylenol 500mg po QID     Muscle deconditioning, physical deconditioning, and ambulatory dysfunction   -poor participation in PT/OT due to cognitive status  -continue care and support at LTCF with ADLs  -fall precautions  -provide nutritional support     Dementia  -provide supportive care, redirection, and reorientation   -encourage social activities     HTN  -BPs trending 110s-120  -not on antihypertensives     Protein-calorie malnutrition- Low BMI, thin, signs of muscle wasting, and frail   -appetite remains poor   -monitor weekly weights  -dietitian following  -continue nutritional supplements    Alter Wall 79, [address]  8/5/189459:00 AM